# Patient Record
Sex: FEMALE
[De-identification: names, ages, dates, MRNs, and addresses within clinical notes are randomized per-mention and may not be internally consistent; named-entity substitution may affect disease eponyms.]

---

## 2022-07-22 ENCOUNTER — HOSPITAL ENCOUNTER (INPATIENT)
Dept: HOSPITAL 94 - ADULT MH | Age: 22
LOS: 6 days | Discharge: HOME | DRG: 751 | End: 2022-07-28
Attending: PSYCHIATRY & NEUROLOGY | Admitting: PSYCHIATRY & NEUROLOGY
Payer: MEDICAID

## 2022-07-22 VITALS — HEIGHT: 71 IN | BODY MASS INDEX: 16.36 KG/M2 | WEIGHT: 116.84 LBS

## 2022-07-22 VITALS — SYSTOLIC BLOOD PRESSURE: 118 MMHG | DIASTOLIC BLOOD PRESSURE: 62 MMHG

## 2022-07-22 DIAGNOSIS — F60.9: ICD-10-CM

## 2022-07-22 DIAGNOSIS — F15.20: ICD-10-CM

## 2022-07-22 DIAGNOSIS — F33.3: Primary | ICD-10-CM

## 2022-07-22 DIAGNOSIS — F41.9: ICD-10-CM

## 2022-07-22 DIAGNOSIS — F10.20: ICD-10-CM

## 2022-07-22 DIAGNOSIS — F17.210: ICD-10-CM

## 2022-07-22 DIAGNOSIS — R45.851: ICD-10-CM

## 2022-07-22 DIAGNOSIS — Z71.6: ICD-10-CM

## 2022-07-22 DIAGNOSIS — F12.10: ICD-10-CM

## 2022-07-22 DIAGNOSIS — J45.909: ICD-10-CM

## 2022-07-22 PROCEDURE — 36415 COLL VENOUS BLD VENIPUNCTURE: CPT

## 2022-07-22 PROCEDURE — 87081 CULTURE SCREEN ONLY: CPT

## 2022-07-22 PROCEDURE — 80061 LIPID PANEL: CPT

## 2022-07-22 PROCEDURE — 81001 URINALYSIS AUTO W/SCOPE: CPT

## 2022-07-22 PROCEDURE — 83036 HEMOGLOBIN GLYCOSYLATED A1C: CPT

## 2022-07-22 RX ADMIN — NICOTINE POLACRILEX PRN MG: 2 LOZENGE ORAL at 22:19

## 2022-07-23 VITALS — SYSTOLIC BLOOD PRESSURE: 112 MMHG | DIASTOLIC BLOOD PRESSURE: 71 MMHG

## 2022-07-23 VITALS — DIASTOLIC BLOOD PRESSURE: 83 MMHG | SYSTOLIC BLOOD PRESSURE: 130 MMHG

## 2022-07-23 LAB
BACTERIA URNS QL MICRO: (no result) /HPF
CHOLEST SERPL-MCNC: 107 MG/DL (ref 0–200)
CHOLEST/HDLC SERPL: 2 {RATIO} (ref 0–4.99)
CLARITY UR: CLEAR
COLOR UR: YELLOW
DEPRECATED SQUAMOUS URNS QL MICRO: (no result) /LPF
GLUCOSE UR STRIP-MCNC: NEGATIVE MG/DL
HBA1C MFR BLD: 5.5 % (ref 4.5–6.2)
HDLC SERPL-MCNC: 54 MG/DL (ref 35–60)
HGB UR QL STRIP: (no result)
KETONES UR STRIP-MCNC: NEGATIVE MG/DL
LDLC SERPL DIRECT ASSAY-MCNC: 39 MG/DL (ref 50–100)
LEUKOCYTE ESTERASE UR QL STRIP: NEGATIVE
MUCOUS THREADS URNS QL MICRO: (no result) /LPF
NITRITE UR QL STRIP: NEGATIVE
PH UR STRIP: 6 [PH] (ref 4.8–8)
PROT UR QL STRIP: NEGATIVE MG/DL
RBC #/AREA URNS HPF: (no result) /HPF (ref 0–2)
SP GR UR STRIP: 1.01 (ref 1–1.03)
TRIGL SERPL-MCNC: 82 MG/DL (ref 20–135)
URN COLLECT METHOD CLASS: (no result)
UROBILINOGEN UR STRIP-MCNC: 0.2 E.U/DL (ref 0.2–1)
WBC #/AREA URNS HPF: (no result) /HPF (ref 0–4)

## 2022-07-23 RX ADMIN — NICOTINE SCH PATCH: 21 PATCH, EXTENDED RELEASE TRANSDERMAL at 08:18
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
## 2022-07-23 NOTE — NUR
Malnutrition consult: Pt unsure of wt loss though with decreased appetite per malnutrition 
risk screen with RN. Current BMI is underweight using scaled wt of 53 kg, though no wt hx in 
EMR to assess for any changes in wt. Per physical assessment pt with no decrease in muscle 
strength or edema. Pending physician notes and documentation of PO intake on regular diet. 
Unable to fully assess nutrition status at this time. Will f/u once more information is 
available.

-------------------------------------------------------------------------------

Addendum: 07/23/22 at 1130 by Lilian Causey RD

-------------------------------------------------------------------------------

Amended: Links added.
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
## 2022-07-24 VITALS — DIASTOLIC BLOOD PRESSURE: 86 MMHG | SYSTOLIC BLOOD PRESSURE: 124 MMHG

## 2022-07-24 VITALS — SYSTOLIC BLOOD PRESSURE: 130 MMHG | DIASTOLIC BLOOD PRESSURE: 80 MMHG

## 2022-07-24 RX ADMIN — NICOTINE POLACRILEX PRN MG: 2 LOZENGE ORAL at 22:11

## 2022-07-24 RX ADMIN — ESCITALOPRAM OXALATE SCH MG: 5 TABLET, FILM COATED ORAL at 07:34

## 2022-07-24 RX ADMIN — NICOTINE SCH PATCH: 21 PATCH, EXTENDED RELEASE TRANSDERMAL at 07:33

## 2022-07-24 RX ADMIN — NICOTINE POLACRILEX PRN MG: 2 LOZENGE ORAL at 14:07
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
## 2022-07-25 VITALS — SYSTOLIC BLOOD PRESSURE: 104 MMHG | DIASTOLIC BLOOD PRESSURE: 73 MMHG

## 2022-07-25 VITALS — DIASTOLIC BLOOD PRESSURE: 69 MMHG | SYSTOLIC BLOOD PRESSURE: 126 MMHG

## 2022-07-25 RX ADMIN — NICOTINE POLACRILEX PRN MG: 2 LOZENGE ORAL at 18:58

## 2022-07-25 RX ADMIN — NICOTINE SCH PATCH: 21 PATCH, EXTENDED RELEASE TRANSDERMAL at 08:12

## 2022-07-25 RX ADMIN — ESCITALOPRAM OXALATE SCH MG: 5 TABLET, FILM COATED ORAL at 08:11
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
## 2022-07-26 VITALS — SYSTOLIC BLOOD PRESSURE: 130 MMHG | DIASTOLIC BLOOD PRESSURE: 68 MMHG

## 2022-07-26 VITALS — DIASTOLIC BLOOD PRESSURE: 63 MMHG | SYSTOLIC BLOOD PRESSURE: 123 MMHG

## 2022-07-26 RX ADMIN — NICOTINE SCH PATCH: 21 PATCH, EXTENDED RELEASE TRANSDERMAL at 08:29

## 2022-07-26 RX ADMIN — ESCITALOPRAM OXALATE SCH MG: 5 TABLET, FILM COATED ORAL at 08:29
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
## 2022-07-26 NOTE — NUR
We did an Art Expression called Feeling Opposites to help focus and work on identifying 
current emotional stated of being and to identify what the opposite emotion would be. It 
also helps express and release these contrasting emotions for the purpose of balance, 
regulation and thought re-structuring. Pt. engaged well in the group, she really enjoys 
working on Art Expressions. She quickly got to work on her art work and was able to do the 
written piece as well. She shared appropriately with the group. Her two emotion states were 
"Trapped" and "Free". She showed herself being stuck in half-way on her trapped side and a 
william a beautiful mountainside on the other side. She shared her feelings dialogue with the 
group and was very supportive of her peers when they shared.



Viviane Guthrie LCSW

## 2022-07-26 NOTE — NUR
CM-Pre-dcp



Presenting Issues: 

Pt's Probable Cause Hearing is Thursday, pt plans on contesting. 



Interventions: 

Clinician met w/pt provided psychoed re the Probable Cause Hearing and its purpose, pt 
expressed desire to attend the hearing. Clinician also engaged pt in pre-dcp activities per 
discussion, pt plans on returning to her parents' home in [place] and connect w/[facility] for services. Clinician provided info re services offer by [facility] highlighting 
[facility] services, pt agreed to a referral to [facility] services. Clinician received 
verbal consent from pt to contact step-mom and engage her in dcp activities. 



Clinician had t/c w/step-mom, per t/c step-mom would like pt to rt home w/family (dad, 
stepmom & 2 sibs). Step mom will assist pt in establishing PMD services in [place], both 
parents will support pt continued MH services w/[facility], step-mom will provide 
transportation home. 



Clinician had t/c with [facility]-D[facility] from [facility], per t/c pt will be able to access 
services even w/o active Med[facility]Cal in [facility], Thayer will schedule post-hospital services 
w/[facility] team when pt's ready to d/c. 



Plan: 

Clinician will continue to engage pt, family & [facility] in dcp activities. 

RICH IsaacW

-------------------------------------------------------------------------------

Addendum: 07/26/22 at 1508 by Hannah Kiran SS

-------------------------------------------------------------------------------

Amended: Links added.

## 2022-07-26 NOTE — NUR
Initial: Pt admitted w/ major depressive disorder per EMR. Currently on 

Regular diet w/ mostly 100% intake of meals meeting needs at this time. 

[facility] 7/24 w/ PRN bowel care available. No nutrition intervention 

implemented at this time, will continue to monitor.

Recs:

1. Continue Regular diet as tolerated

2. Bowel care PRN

3. Weekly wts

-------------------------------------------------------------------------------

Addendum: 07/26/22 at 0738 by Saurabh Tavares RD

-------------------------------------------------------------------------------

Amended: Links added.

## 2022-07-27 VITALS — DIASTOLIC BLOOD PRESSURE: 76 MMHG | SYSTOLIC BLOOD PRESSURE: 116 MMHG

## 2022-07-27 VITALS — SYSTOLIC BLOOD PRESSURE: 130 MMHG | DIASTOLIC BLOOD PRESSURE: 77 MMHG

## 2022-07-27 RX ADMIN — NICOTINE SCH PATCH: 21 PATCH, EXTENDED RELEASE TRANSDERMAL at 08:00

## 2022-07-27 RX ADMIN — ESCITALOPRAM OXALATE SCH MG: 5 TABLET, FILM COATED ORAL at 07:26
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
## 2022-07-28 VITALS — DIASTOLIC BLOOD PRESSURE: 81 MMHG | SYSTOLIC BLOOD PRESSURE: 130 MMHG

## 2022-07-28 RX ADMIN — NICOTINE SCH PATCH: 21 PATCH, EXTENDED RELEASE TRANSDERMAL at 08:00

## 2022-07-28 RX ADMIN — ESCITALOPRAM OXALATE SCH MG: 5 TABLET, FILM COATED ORAL at 07:41

## 2022-07-28 NOTE — NUR
DISCHARGE NOTE 



Pt was discharged to home with step mother on 7/28/22 at 12:35 in stable condition. Pt 
inventory was complete, all discharge paperwork was signed. Follow up was reiterated and 
confirmed.
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
